# Patient Record
Sex: MALE | Race: WHITE | NOT HISPANIC OR LATINO | Employment: UNEMPLOYED | URBAN - METROPOLITAN AREA
[De-identification: names, ages, dates, MRNs, and addresses within clinical notes are randomized per-mention and may not be internally consistent; named-entity substitution may affect disease eponyms.]

---

## 2020-07-18 ENCOUNTER — HOSPITAL ENCOUNTER (EMERGENCY)
Facility: HOSPITAL | Age: 35
Discharge: HOME | End: 2020-07-18
Attending: EMERGENCY MEDICINE

## 2020-07-18 VITALS
HEIGHT: 67 IN | HEART RATE: 103 BPM | OXYGEN SATURATION: 98 % | WEIGHT: 140 LBS | RESPIRATION RATE: 18 BRPM | DIASTOLIC BLOOD PRESSURE: 74 MMHG | BODY MASS INDEX: 21.97 KG/M2 | SYSTOLIC BLOOD PRESSURE: 136 MMHG | TEMPERATURE: 99 F

## 2020-07-18 DIAGNOSIS — S51.812A LACERATION OF LEFT FOREARM, INITIAL ENCOUNTER: Primary | ICD-10-CM

## 2020-07-18 PROCEDURE — 12002 RPR S/N/AX/GEN/TRNK2.6-7.5CM: CPT

## 2020-07-18 PROCEDURE — 99283 EMERGENCY DEPT VISIT LOW MDM: CPT | Mod: 25

## 2020-07-18 PROCEDURE — 25000003 PHARM REV CODE 250: Performed by: EMERGENCY MEDICINE

## 2020-07-18 RX ORDER — NAPROXEN 250 MG/1
500 TABLET ORAL
Status: COMPLETED | OUTPATIENT
Start: 2020-07-18 | End: 2020-07-18

## 2020-07-18 RX ORDER — NAPROXEN 500 MG/1
500 TABLET ORAL 2 TIMES DAILY PRN
Qty: 15 TABLET | Refills: 0 | Status: SHIPPED | OUTPATIENT
Start: 2020-07-18 | End: 2021-07-18

## 2020-07-18 RX ADMIN — NAPROXEN 500 MG: 250 TABLET ORAL at 06:07

## 2020-07-18 RX ADMIN — BACITRACIN ZINC NEOMYCIN SULFATE POLYMYXIN B SULFATE: 400; 3.5; 5 OINTMENT TOPICAL at 06:07

## 2020-07-18 NOTE — ED PROVIDER NOTES
Encounter Date: 7/18/2020    SCRIBE #1 NOTE: I, Zahra Narvaez, am scribing for, and in the presence of, Dr. Drummond.       History     Chief Complaint   Patient presents with    Laceration     left forearm       Time seen by provider: 6:04 PM on 07/18/2020    Pablito Azevedo is a 34 y.o. male who presents to the ED via EMS with an onset of left arm pain s/p a laceration PTA.  Patient reports cutting the sleeves off of his shirt in the mirror when he missed and accidentally cut his arm causing him to activate EMS.  He notes using a fresh  to remove his sleeves.  Patient confirm his arm pain is rated a 6/10.  The patient denies allergies or any other symptoms at this time. PMHx includes asthma and hepatitis C. No PSHx.    The history is provided by the patient.     Review of patient's allergies indicates:  No Known Allergies  Past Medical History:   Diagnosis Date    Asthma     Hepatitis C      No past surgical history on file.  No family history on file.  Social History     Tobacco Use    Smoking status: Not on file   Substance Use Topics    Alcohol use: Not on file    Drug use: Not on file     Review of Systems   Constitutional: Negative for activity change, appetite change, chills, fatigue and fever.   Eyes: Negative for visual disturbance.   Respiratory: Negative for apnea and shortness of breath.    Cardiovascular: Negative for chest pain and palpitations.   Gastrointestinal: Negative for abdominal distention and abdominal pain.   Genitourinary: Negative for difficulty urinating.   Musculoskeletal: Positive for myalgias. Negative for neck pain.   Skin: Negative for pallor and rash.   Neurological: Negative for headaches.   Hematological: Does not bruise/bleed easily.   Psychiatric/Behavioral: Negative for agitation.       Physical Exam     Initial Vitals   BP Pulse Resp Temp SpO2   07/18/20 1808 07/18/20 1808 07/18/20 1808 07/18/20 1806 07/18/20 1808   136/74 103 18 99 °F (37.2 °C) 98 %      MAP        --                Physical Exam    Nursing note and vitals reviewed.  Constitutional: He appears well-developed and well-nourished.   HENT:   Head: Normocephalic and atraumatic.   Eyes: Conjunctivae are normal.   Neck: Normal range of motion. Neck supple.   Cardiovascular: Normal rate, regular rhythm and normal heart sounds. Exam reveals no gallop and no friction rub.    No murmur heard.  Pulmonary/Chest: Breath sounds normal. No respiratory distress. He has no wheezes. He has no rhonchi. He has no rales.   Abdominal: Soft. He exhibits no distension. There is no abdominal tenderness.   Musculoskeletal: Normal range of motion.      Left forearm: He exhibits laceration.      Comments: 6.8 cm laceration to the flexor surface of the left forearm.   Neurological: He is alert and oriented to person, place, and time.   Skin: Skin is warm and dry.   Psychiatric: He has a normal mood and affect.         ED Course   Lac Repair    Date/Time: 7/18/2020 8:46 PM  Performed by: Soy Drummond III, MD  Authorized by: Soy Drummond III, MD   Laceration length: 6.8 cm  Foreign bodies: no foreign bodies  Tendon involvement: none  Nerve involvement: none  Vascular damage: no    Anesthesia:  Local Anesthetic: bupivacaine 0.5% without epinephrine  Anesthetic total: 6 mL  Patient sedated: no  Preparation: Patient was prepped and draped in the usual sterile fashion.  Amount of cleaning: standard  Debridement: none  Degree of undermining: none  Skin closure: staples  Number of sutures: 11  Technique: simple  Approximation: close  Approximation difficulty: simple  Patient tolerance: Patient tolerated the procedure well with no immediate complications        Labs Reviewed - No data to display       Imaging Results    None          Medical Decision Making:   History:   Old Medical Records: I decided to obtain old medical records.  ED Management:  34-year-old male presents with an accidental self-inflicted laceration of the left  forearm.  He has no evidence of vascular injury.  Laceration is cleaned and repaired with staples.  Patient tolerated the procedure well.       APC / Resident Notes:   I, Dr. Soy Drummond III, personally performed the services described in this documentation. All medical record entries made by the scribe were at my direction and in my presence.  I have reviewed the chart and agree that the record reflects my personal performance and is accurate and complete       Scribe Attestation:   Scribe #1: I performed the above scribed service and the documentation accurately describes the services I performed. I attest to the accuracy of the note.                          Clinical Impression:       ICD-10-CM ICD-9-CM   1. Laceration of left forearm, initial encounter  S51.812A 881.00         Disposition:   Disposition: Discharged  Condition: Stable     ED Disposition Condition    Discharge Stable        ED Prescriptions     Medication Sig Dispense Start Date End Date Auth. Provider    naproxen (NAPROSYN) 500 MG tablet Take 1 tablet (500 mg total) by mouth 2 (two) times daily as needed. 15 tablet 7/18/2020 7/18/2021 Soy Drummond III, MD        Follow-up Information     Follow up With Specialties Details Why Contact Info    [facility] Emergency Medicine In 10 days  [address] [place] [id number] [phone]                                     Soy Drummond III, MD  07/18/20 2047

## 2020-07-28 ENCOUNTER — HOSPITAL ENCOUNTER (EMERGENCY)
Facility: HOSPITAL | Age: 35
Discharge: HOME | End: 2020-07-28
Attending: EMERGENCY MEDICINE

## 2020-07-28 VITALS
DIASTOLIC BLOOD PRESSURE: 79 MMHG | BODY MASS INDEX: 21.93 KG/M2 | HEART RATE: 94 BPM | SYSTOLIC BLOOD PRESSURE: 124 MMHG | OXYGEN SATURATION: 96 % | RESPIRATION RATE: 16 BRPM | TEMPERATURE: 99 F | WEIGHT: 140 LBS

## 2020-07-28 DIAGNOSIS — Z48.02 ENCOUNTER FOR STAPLE REMOVAL: Primary | ICD-10-CM

## 2020-07-28 PROCEDURE — 99283 EMERGENCY DEPT VISIT LOW MDM: CPT

## 2020-07-28 RX ORDER — CLINDAMYCIN HYDROCHLORIDE 150 MG/1
300 CAPSULE ORAL 4 TIMES DAILY
Qty: 40 CAPSULE | Refills: 0 | Status: SHIPPED | OUTPATIENT
Start: 2020-07-28 | End: 2020-08-02

## 2020-07-28 NOTE — ED PROVIDER NOTES
Encounter Date: 7/28/2020       History     Chief Complaint   Patient presents with    Wound Check     staple removal to [facility]. staples placed 10 days ago. Redness to wound site.     HPI   34-year-old man presents to the ED for evaluation laceration he sustained 10 days ago over his left forearm.  He was seen in the emergency department and had 10 staples placed.  He complains of redness and tenderness around the wound site.  Denies any fever.  He is prescribed neomycin/bacitracin/polymyxin B ointment to apply on the wound.  Review of patient's allergies indicates:  No Known Allergies  Past Medical History:   Diagnosis Date    Asthma     Hepatitis C      No past surgical history on file.  No family history on file.  Social History     Tobacco Use    Smoking status: Not on file   Substance Use Topics    Alcohol use: Not on file    Drug use: Not on file     Review of Systems   Constitutional: Negative for fever.   HENT: Negative for sore throat.    Respiratory: Negative for shortness of breath.    Cardiovascular: Negative for chest pain.   Gastrointestinal: Negative for nausea.   Genitourinary: Negative for dysuria.   Musculoskeletal: Negative for back pain.   Skin: Positive for rash and wound.   Neurological: Negative for weakness.   Hematological: Does not bruise/bleed easily.       Physical Exam     Initial Vitals [07/28/20 1135]   BP Pulse Resp Temp SpO2   124/79 94 16 99 °F (37.2 °C) 96 %      MAP       --         Physical Exam  Physical Exam   Nursing note and vitals reviewed.   Constitutional: Well-developed and well-nourished.  Non-toxic appearance. No distress.   HENT:   Head: Normocephalic and atraumatic.   Eyes: EOM are normal.   Neck: Normal range of motion. Neck supple. No rigidity.   Pulmonary/Chest: No respiratory distress. Clear equal breath sounds bilaterally  Musculoskeletal: Normal range of motion.   Neurological: Alert and oriented to person, place, and time.   Skin:  There is a repaired  laceration with staples across his mid left forearm with mild surrounding erythema.  No discharge.  There is mild tenderness.  Patient endorses a small area of numbness just distal to the laceration over his skin which is approximately 2 cm x 3 cm.  He has normal strength and full range of motion of his hand and wrist.  Distal pulses are 2+.  Psychiatric: Normal mood and affect. Behavior is normal. Judgment and thought content normal.       ED Course   Suture Removal    Date/Time: 7/28/2020 12:50 PM  Location procedure was performed: [facility] EMERGENCY DEPARTMENT  Performed by: Archie Singleton MD  Authorized by: Archie Singleton MD   Body area: upper extremity  Location details: left lower arm  Wound Appearance: clean, erythematous, tender and no drainage  Staples Removed: 11  Facility: sutures placed in this facility  Complications: No        Labs Reviewed - No data to display       Imaging Results    None          Medical Decision Making:   History:   Old Medical Records: I decided to obtain old medical records.  Initial Assessment:   34-year-old man presents to the ED for evaluation of staple removal and wound check.  Staples were placed at this facility.  Wound is well close without evidence of dehiscence.  There is marginal erythema.  No discharge.  Staples were removed.  Patient will be placed on antibiotic therapy for possible surrounding cellulitis.  Low suspicion for abscess formation.  Return precautions discussed.  He is discharged improved in no acute distress.                                 Clinical Impression:       ICD-10-CM ICD-9-CM   1. Encounter for staple removal  Z48.02 V58.32                                Archie Singleton MD  07/28/20 6420